# Patient Record
Sex: FEMALE | Race: WHITE | NOT HISPANIC OR LATINO | Employment: UNEMPLOYED | URBAN - METROPOLITAN AREA
[De-identification: names, ages, dates, MRNs, and addresses within clinical notes are randomized per-mention and may not be internally consistent; named-entity substitution may affect disease eponyms.]

---

## 2018-10-02 ENCOUNTER — OFFICE VISIT (OUTPATIENT)
Dept: URGENT CARE | Facility: CLINIC | Age: 48
End: 2018-10-02
Payer: COMMERCIAL

## 2018-10-02 VITALS
HEIGHT: 64 IN | HEART RATE: 75 BPM | BODY MASS INDEX: 22.2 KG/M2 | OXYGEN SATURATION: 99 % | SYSTOLIC BLOOD PRESSURE: 124 MMHG | TEMPERATURE: 99.2 F | WEIGHT: 130 LBS | RESPIRATION RATE: 18 BRPM | DIASTOLIC BLOOD PRESSURE: 85 MMHG

## 2018-10-02 DIAGNOSIS — T63.441A BEE STING, ACCIDENTAL OR UNINTENTIONAL, INITIAL ENCOUNTER: Primary | ICD-10-CM

## 2018-10-02 PROCEDURE — 99283 EMERGENCY DEPT VISIT LOW MDM: CPT | Performed by: PHYSICIAN ASSISTANT

## 2018-10-02 PROCEDURE — G0382 LEV 3 HOSP TYPE B ED VISIT: HCPCS | Performed by: PHYSICIAN ASSISTANT

## 2018-10-02 RX ORDER — METHYLPREDNISOLONE 4 MG/1
TABLET ORAL
Qty: 21 TABLET | Refills: 0 | Status: SHIPPED | OUTPATIENT
Start: 2018-10-02

## 2018-10-02 RX ORDER — HYDROXYZINE 50 MG/1
50 TABLET, FILM COATED ORAL 3 TIMES DAILY PRN
COMMUNITY

## 2018-10-03 NOTE — PROGRESS NOTES
[facility]        NAME: Canda Dakins is a 50 y o  female  : 1970    MRN: [id number]  DATE: October 3, 2018  TIME: 9:42 AM    Assessment and Plan   Bee sting, accidental or unintentional, initial encounter [T63 [id number]]  1  Bee sting, accidental or unintentional, initial encounter  Methylprednisolone 4 MG TBPK         Patient Instructions       Follow up with PCP in 3-5 days  Proceed to  ER if symptoms worsen  Chief Complaint     Chief Complaint   Patient presents with   [address]     stung by bee a couple days ago, continues to swell         History of Present Illness       Insect Bite   This is a new problem  The current episode started in the past 7 days  The problem occurs constantly  The problem has been unchanged  Pertinent negatives include no diaphoresis, numbness, swollen glands or weakness  Nothing aggravates the symptoms  Treatments tried: antihistamine x 1; hydrocortisone cream  The treatment provided mild relief  Review of Systems   Review of Systems   Constitutional: Negative for diaphoresis  Neurological: Negative for weakness and numbness           Current Medications       Current Outpatient Prescriptions:     hydrOXYzine HCL (ATARAX) 50 mg tablet, Take 50 mg by mouth 3 (three) times a day as needed for itching, Disp: , Rfl:     Methylprednisolone 4 MG TBPK, Use as directed on package, Disp: 21 tablet, Rfl: 0    Current Allergies     Allergies as of 10/02/2018 - Reviewed 10/02/2018   Allergen Reaction Noted    Penicillins  10/02/2018    Sulfa antibiotics  10/02/2018            The following portions of the patient's history were reviewed and updated as appropriate: allergies, current medications, past family history, past medical history, past social history, past surgical history and problem list      Past Medical History:   Diagnosis Date    Carpal tunnel syndrome     Cubital tunnel syndrome        Past Surgical History:   Procedure Laterality Date    HERNIA REPAIR         No family history on file  Medications have been verified  Objective   /85   Pulse 75   Temp 99 2 °F (37 3 °C) (Tympanic)   Resp 18   Ht 5' 4" (1 626 m)   Wt 59 kg (130 lb)   SpO2 99%   BMI 22 31 kg/m²        Physical Exam     Physical Exam   Constitutional: She appears well-developed and well-nourished  Cardiovascular: Normal rate, regular rhythm, normal heart sounds and intact distal pulses  Exam reveals no gallop and no friction rub  No murmur heard  Pulmonary/Chest: Effort normal and breath sounds normal  No respiratory distress  She has no wheezes  She has no rales  Abdominal: Soft  Bowel sounds are normal  She exhibits no distension  There is no tenderness  There is no rebound and no guarding     Skin: